# Patient Record
Sex: FEMALE
[De-identification: names, ages, dates, MRNs, and addresses within clinical notes are randomized per-mention and may not be internally consistent; named-entity substitution may affect disease eponyms.]

---

## 2018-08-24 ENCOUNTER — HOSPITAL (OUTPATIENT)
Dept: OTHER | Age: 28
End: 2018-08-24

## 2019-04-10 ENCOUNTER — OFFICE VISIT (OUTPATIENT)
Dept: INTERNAL MEDICINE | Age: 29
End: 2019-04-10

## 2019-04-10 ENCOUNTER — TELEPHONE (OUTPATIENT)
Dept: INTERNAL MEDICINE | Age: 29
End: 2019-04-10

## 2019-04-10 DIAGNOSIS — H61.21 HEARING LOSS OF RIGHT EAR DUE TO CERUMEN IMPACTION: ICD-10-CM

## 2019-04-10 DIAGNOSIS — J45.20 MILD INTERMITTENT ASTHMA WITHOUT COMPLICATION: Primary | ICD-10-CM

## 2019-04-10 DIAGNOSIS — F17.200 SMOKING ADDICTION: ICD-10-CM

## 2019-04-10 DIAGNOSIS — J30.2 SEASONAL ALLERGIC RHINITIS, UNSPECIFIED TRIGGER: ICD-10-CM

## 2019-04-10 DIAGNOSIS — F43.10 PTSD (POST-TRAUMATIC STRESS DISORDER): ICD-10-CM

## 2019-04-10 PROCEDURE — 99204 OFFICE O/P NEW MOD 45 MIN: CPT | Performed by: INTERNAL MEDICINE

## 2019-04-10 PROCEDURE — 69209 REMOVE IMPACTED EAR WAX UNI: CPT | Performed by: INTERNAL MEDICINE

## 2019-04-10 RX ORDER — ALBUTEROL SULFATE 90 UG/1
AEROSOL, METERED RESPIRATORY (INHALATION) EVERY 4 HOURS PRN
COMMUNITY
End: 2019-04-10 | Stop reason: SDUPTHER

## 2019-04-10 RX ORDER — FLUTICASONE PROPIONATE AND SALMETEROL 250; 50 UG/1; UG/1
1 POWDER RESPIRATORY (INHALATION) 2 TIMES DAILY
Qty: 1 EACH | Refills: 11 | Status: SHIPPED | OUTPATIENT
Start: 2019-04-10

## 2019-04-10 RX ORDER — PREDNISONE 20 MG/1
20 TABLET ORAL DAILY
Qty: 10 TABLET | Refills: 10 | Status: SHIPPED | OUTPATIENT
Start: 2019-04-10

## 2019-04-10 RX ORDER — ALBUTEROL SULFATE 90 UG/1
2 AEROSOL, METERED RESPIRATORY (INHALATION) EVERY 4 HOURS PRN
Qty: 1 INHALER | Refills: 12 | Status: SHIPPED | OUTPATIENT
Start: 2019-04-10 | End: 2019-04-11 | Stop reason: SDUPTHER

## 2019-04-10 RX ORDER — ALBUTEROL SULFATE 90 UG/1
2 AEROSOL, METERED RESPIRATORY (INHALATION) EVERY 4 HOURS PRN
COMMUNITY
End: 2019-04-10 | Stop reason: SDUPTHER

## 2019-04-10 SDOH — HEALTH STABILITY: MENTAL HEALTH
STRESS IS WHEN SOMEONE FEELS TENSE, NERVOUS, ANXIOUS, OR CAN'T SLEEP AT NIGHT BECAUSE THEIR MIND IS TROUBLED. HOW STRESSED ARE YOU?: ONLY A LITTLE

## 2019-04-10 SDOH — HEALTH STABILITY: PHYSICAL HEALTH: ON AVERAGE, HOW MANY DAYS PER WEEK DO YOU ENGAGE IN MODERATE TO STRENUOUS EXERCISE (LIKE A BRISK WALK)?: 7 DAYS

## 2019-04-10 SDOH — HEALTH STABILITY: PHYSICAL HEALTH: ON AVERAGE, HOW MANY MINUTES DO YOU ENGAGE IN EXERCISE AT THIS LEVEL?: 150+ MIN

## 2019-04-10 ASSESSMENT — ENCOUNTER SYMPTOMS
HEADACHES: 0
VOICE CHANGE: 0
BRUISES/BLEEDS EASILY: 0
CHEST TIGHTNESS: 1
TROUBLE SWALLOWING: 0
SORE THROAT: 0
STRIDOR: 1
CONSTITUTIONAL NEGATIVE: 1
RHINORRHEA: 0
WHEEZING: 1
ABDOMINAL PAIN: 0
ENDOCRINE NEGATIVE: 1
DIZZINESS: 0
LIGHT-HEADEDNESS: 0
COUGH: 1
SHORTNESS OF BREATH: 1

## 2019-04-10 ASSESSMENT — PAIN SCALES - GENERAL: PAINLEVEL: 1-2

## 2019-04-11 ENCOUNTER — APPOINTMENT (OUTPATIENT)
Dept: INTERNAL MEDICINE | Age: 29
End: 2019-04-11

## 2019-04-11 RX ORDER — FLUTICASONE PROPIONATE AND SALMETEROL 113; 14 UG/1; UG/1
1 POWDER, METERED RESPIRATORY (INHALATION) 2 TIMES DAILY
Qty: 1 EACH | Refills: 3 | Status: SHIPPED | OUTPATIENT
Start: 2019-04-11

## 2019-04-12 ENCOUNTER — APPOINTMENT (OUTPATIENT)
Dept: INTERNAL MEDICINE | Age: 29
End: 2019-04-12

## 2019-09-12 DIAGNOSIS — J45.20 MILD INTERMITTENT ASTHMA WITHOUT COMPLICATION: Primary | ICD-10-CM

## 2019-11-21 ENCOUNTER — EXTERNAL RECORD (OUTPATIENT)
Dept: HEALTH INFORMATION MANAGEMENT | Facility: OTHER | Age: 29
End: 2019-11-21

## 2019-11-27 ENCOUNTER — TELEPHONE (OUTPATIENT)
Dept: INTERNAL MEDICINE | Age: 29
End: 2019-11-27

## 2020-09-01 PROBLEM — Z97.5 IUD (INTRAUTERINE DEVICE) IN PLACE: Status: ACTIVE | Noted: 2020-09-01

## 2021-02-10 PROBLEM — F43.10 PTSD (POST-TRAUMATIC STRESS DISORDER): Status: ACTIVE | Noted: 2021-02-10

## 2021-05-25 VITALS
DIASTOLIC BLOOD PRESSURE: 88 MMHG | BODY MASS INDEX: 22.75 KG/M2 | TEMPERATURE: 98.2 F | SYSTOLIC BLOOD PRESSURE: 120 MMHG | WEIGHT: 144.95 LBS | HEART RATE: 98 BPM | OXYGEN SATURATION: 95 % | HEIGHT: 67 IN

## 2024-01-20 ENCOUNTER — APPOINTMENT (OUTPATIENT)
Dept: CT IMAGING | Facility: HOSPITAL | Age: 34
End: 2024-01-20
Attending: EMERGENCY MEDICINE
Payer: OTHER MISCELLANEOUS

## 2024-01-20 ENCOUNTER — OFFICE VISIT (OUTPATIENT)
Dept: FAMILY MEDICINE CLINIC | Facility: CLINIC | Age: 34
End: 2024-01-20
Payer: COMMERCIAL

## 2024-01-20 ENCOUNTER — HOSPITAL ENCOUNTER (EMERGENCY)
Facility: HOSPITAL | Age: 34
Discharge: HOME | End: 2024-01-20
Attending: EMERGENCY MEDICINE
Payer: OTHER MISCELLANEOUS

## 2024-01-20 VITALS
RESPIRATION RATE: 16 BRPM | TEMPERATURE: 98 F | OXYGEN SATURATION: 97 % | DIASTOLIC BLOOD PRESSURE: 77 MMHG | SYSTOLIC BLOOD PRESSURE: 111 MMHG | HEART RATE: 70 BPM

## 2024-01-20 VITALS
HEART RATE: 93 BPM | TEMPERATURE: 98 F | DIASTOLIC BLOOD PRESSURE: 72 MMHG | OXYGEN SATURATION: 98 % | SYSTOLIC BLOOD PRESSURE: 124 MMHG | WEIGHT: 149.81 LBS | BODY MASS INDEX: 23.51 KG/M2 | RESPIRATION RATE: 20 BRPM | HEIGHT: 67 IN

## 2024-01-20 DIAGNOSIS — M54.2 NECK PAIN, ACUTE: Primary | ICD-10-CM

## 2024-01-20 DIAGNOSIS — M54.9 UPPER BACK PAIN ON RIGHT SIDE: Primary | ICD-10-CM

## 2024-01-20 LAB — B-HCG UR QL: NEGATIVE

## 2024-01-20 PROCEDURE — 99215 OFFICE O/P EST HI 40 MIN: CPT | Performed by: NURSE PRACTITIONER

## 2024-01-20 PROCEDURE — 72128 CT CHEST SPINE W/O DYE: CPT | Performed by: EMERGENCY MEDICINE

## 2024-01-20 PROCEDURE — 96374 THER/PROPH/DIAG INJ IV PUSH: CPT

## 2024-01-20 PROCEDURE — 99284 EMERGENCY DEPT VISIT MOD MDM: CPT

## 2024-01-20 PROCEDURE — 81025 URINE PREGNANCY TEST: CPT

## 2024-01-20 PROCEDURE — 96375 TX/PRO/DX INJ NEW DRUG ADDON: CPT

## 2024-01-20 PROCEDURE — 72125 CT NECK SPINE W/O DYE: CPT | Performed by: EMERGENCY MEDICINE

## 2024-01-20 PROCEDURE — 3078F DIAST BP <80 MM HG: CPT | Performed by: NURSE PRACTITIONER

## 2024-01-20 PROCEDURE — 3008F BODY MASS INDEX DOCD: CPT | Performed by: NURSE PRACTITIONER

## 2024-01-20 PROCEDURE — 3074F SYST BP LT 130 MM HG: CPT | Performed by: NURSE PRACTITIONER

## 2024-01-20 RX ORDER — CYCLOBENZAPRINE HCL 10 MG
10 TABLET ORAL 3 TIMES DAILY PRN
Qty: 20 TABLET | Refills: 0 | Status: SHIPPED | OUTPATIENT
Start: 2024-01-20 | End: 2024-01-27

## 2024-01-20 RX ORDER — IBUPROFEN 600 MG/1
600 TABLET ORAL EVERY 8 HOURS PRN
Qty: 30 TABLET | Refills: 0 | Status: SHIPPED | OUTPATIENT
Start: 2024-01-20 | End: 2024-01-27

## 2024-01-20 RX ORDER — METHYLPREDNISOLONE 4 MG/1
TABLET ORAL
Qty: 21 TABLET | Refills: 0 | Status: SHIPPED | OUTPATIENT
Start: 2024-01-20

## 2024-01-20 RX ORDER — CYCLOBENZAPRINE HCL 10 MG
10 TABLET ORAL ONCE
Status: COMPLETED | OUTPATIENT
Start: 2024-01-20 | End: 2024-01-20

## 2024-01-20 RX ORDER — BUPROPION HYDROCHLORIDE 300 MG/1
TABLET ORAL
COMMUNITY
Start: 2023-12-16

## 2024-01-20 RX ORDER — KETOROLAC TROMETHAMINE 15 MG/ML
15 INJECTION, SOLUTION INTRAMUSCULAR; INTRAVENOUS ONCE
Status: COMPLETED | OUTPATIENT
Start: 2024-01-20 | End: 2024-01-20

## 2024-01-20 RX ORDER — MORPHINE SULFATE 4 MG/ML
4 INJECTION, SOLUTION INTRAMUSCULAR; INTRAVENOUS ONCE
Status: COMPLETED | OUTPATIENT
Start: 2024-01-20 | End: 2024-01-20

## 2024-01-20 NOTE — DISCHARGE INSTRUCTIONS
Please return to the emergency department if you develop severe pain that is not controlled by pain medications or if you are unable to walk because of pain or weakness.  Return to the emergency department immediately if you develop fevers, loss of bowel or bladder control (dribbling of urine or having accidents you would not normally have), inability to urinate, numbness of your genital or anal area, or weakness/numbness of your legs or arms as these could all be signs of a serious medical emergency.

## 2024-01-20 NOTE — ED PROVIDER NOTES
Patient Seen in: [facility] Emergency Department      History     Chief Complaint   Patient presents with    Neck Pain     Stated Complaint: Severe neck pain 10/10, crying from pain. Radiates to right arm and right chest*    Subjective:   HPI  33-year-old female is right-hand-dominant presents for evaluation of back pain.  He works as a  and does do repetitive movements with the right upper extremity.  The day before the pain started a large dog was fighting with her requiring her to maneuver in the chest or arm in different ways.  She also does heavy lifting at work.  She woke up with the pain 3 days ago.  Pain is located at the upper back and radiates towards the right shoulder blade and towards the right chest.  She feels like a cramping sensation if she does not maintain good posture.  Pain is worse with heavy lifting.  No loss of strength or sensation.  No difficulty breathing.  No ripping or tearing sensation in the chest or back.      Objective:   No pertinent past medical history.            No pertinent past surgical history.              No pertinent social history.            Review of Systems    Positive for stated complaint: Severe neck pain 10/10, crying from pain. Radiates to right arm and right chest*  Other systems are as noted in HPI.  Constitutional and vital signs reviewed.      All other systems reviewed and negative except as noted above.    Physical Exam     ED Triage Vitals [01/20/24 0752]   BP (!) 142/94   Pulse 79   Resp 16   Temp 97.8 °F (36.6 °C)   Temp src Temporal   SpO2 98 %   O2 Device None (Room air)       Current:/77   Pulse 70   Temp 97.8 °F (36.6 °C) (Temporal)   Resp 16   LMP 01/14/2024   SpO2 97%         Physical Exam  Vitals and nursing note reviewed.   Constitutional:       Appearance: She is well-developed.   HENT:      Head: Normocephalic and atraumatic.   Eyes:      Extraocular Movements: Extraocular movements intact.   Cardiovascular:       Rate and Rhythm: Normal rate and regular rhythm.      Heart sounds: Normal heart sounds.      Comments: Bilateral carotid and radial pulses are 2+  Pulmonary:      Effort: Pulmonary effort is normal.      Breath sounds: Normal breath sounds.   Abdominal:      Palpations: Abdomen is soft.   Musculoskeletal:         General: Normal range of motion.      Cervical back: Normal range of motion.        Back:       Comments: Tender to palpation in the demarcated area.  There is no underlying rash or lesion   Skin:     General: Skin is warm.   Neurological:      General: No focal deficit present.      Mental Status: She is alert.      Cranial Nerves: No cranial nerve deficit.      Sensory: No sensory deficit.      Motor: No weakness.      Coordination: Coordination normal.      Gait: Gait normal.      Comments: No focal deficits       Differential diagnosis includes but is not limited to cervical or thoracic radiculopathy, muscular strain or spasm, less likely compression fracture        ED Course     Labs Reviewed   POCT PREGNANCY URINE - Normal          ED Course as of 01/20/24 0928  ------------------------------------------------------------  Time: 01/20 0840  Comment: Upreg negative     CT SPINE THORACIC (CPT=72128)    Result Date: 1/20/2024  CONCLUSION:  1.  Subtle rightward curvature of the thoracic spine centered at T8-T9 which may be positional.  Minimal degenerative change within the thoracic spine as discussed above.  No acute osseous abnormality of the thoracic spine. 2.  Nonspecific nodular biapical pleural thickening which may represent sequela of previous pneumonitis or scar/atelectasis.      Dictated by (CST): Omer Robert MD on 1/20/2024 at 9:09 AM     Finalized by (CST): Omer Robert MD on 1/20/2024 at 9:11 AM          CT SPINE CERVICAL (CPT=72125)    Result Date: 1/20/2024  CONCLUSION:  1.  No acute osseous abnormalities cervical spine.  Straightening and slight reversal of normal cervical lordosis  centered at C5 which may be due to patient positioning or muscle spasm. 2.  Right paracentral disc bulge/osteophyte complex at C6-7 resulting in moderate narrowing the central canal and severe right greater than left neural foraminal narrowing.  Dictated by (CST): Omer Robert MD on 1/20/2024 at 9:06 AM     Finalized by (CST): Omer Robert MD on 1/20/2024 at 9:09 AM                  [facility]                                         Medical Decision Making  On arrival to the ED patient is ambulatory, without neurovascular deficits.  Per my independent interpretation of CT's cervical and thoracic spines there is C6-7 disc bulge as well as possible muscular spasm.  Discussed with patient that both of these may be contributing to her pain.  Discussed use of muscle relaxant, Medrol Dosepak, NSAIDs and outpatient follow-up and she is comfortable with the plan.  Return precautions provided for any worsening of her condition or neurovascular deficits and she is agreeable.    Problems Addressed:  Upper back pain on right side: acute illness or injury with systemic symptoms    Amount and/or Complexity of Data Reviewed  Radiology: ordered and independent interpretation performed. Decision-making details documented in ED Course.    Risk  Prescription drug management.  Parenteral controlled substances.        Disposition and Plan     Clinical Impression:  1. Upper back pain on right side         Disposition:  Discharge  1/20/2024  9:25 am    Follow-up:  Rudolph Sainz MD  [address] [place] [id number]  [phone]    Follow up            Medications Prescribed:  Current Discharge Medication List        START taking these medications    Details   ibuprofen 600 MG Oral Tab Take 1 tablet (600 mg total) by mouth every 8 (eight) hours as needed for Pain or Fever.  Qty: 30 tablet, Refills: 0      cyclobenzaprine 10 MG Oral Tab Take 1 tablet (10 mg total) by mouth 3 (three) times daily as needed for Muscle spasms.  Qty: 20  tablet, Refills: 0      methylPREDNISolone 4 MG Oral Tablet Therapy Pack Take as directed on dose pack instructions  Qty: 21 tablet, Refills: 0

## 2024-01-20 NOTE — ED INITIAL ASSESSMENT (HPI)
Severe neck pain 10/10, crying from pain. Woke up with this pain on Thursday, no injury or trauma to site. Possible overuse per pt.        Radiates to right arm and right chest. Some numbness to right upper arm. Slightly weaker right hand grasp. She is a  and had issues with grooming a dog 3 days ago.

## 2024-01-20 NOTE — PROGRESS NOTES
CHIEF COMPLAINT:     Chief Complaint   Patient presents with    Back Pain     Extreme pain, sudden to neck / upper back, nerve pain can't move - Entered by patient       HPI:   NAME@ is a 33 year old female who presents for complaint of neck pain for 3 days.    Location: right neck and right upper back  Severity: 10/10  Pain radiates to: right arm  Caused by: grooming a dog   Worsened by: any movement  Associated symptoms: numbness to right upper arm, severe pain   Home treatment tried: OTC  Prior neck pain hx: denies      Current Outpatient Medications   Medication Sig Dispense Refill    buPROPion  MG Oral Tablet 24 Hr       albuterol (PROAIR HFA) 108 (90 Base) MCG/ACT Inhalation Aero Soln Inhale 2 puffs into the lungs every 4 (four) hours as needed for Wheezing. 18 g 2    fluticasone propionate 50 MCG/ACT Nasal Suspension 2 sprays by Nasal route daily. 48 g 3    DULERA 200-5 MCG/ACT Inhalation Aerosol INHALE 2 PUFFS INTO THE LUNGS TWICE DAILY 13 g 0    DULERA 200-5 MCG/ACT Inhalation Aerosol INHALE 2 PUFFS INTO THE LUNGS TWICE DAILY 13 g 0    Levocetirizine Dihydrochloride 5 MG Oral Tab Take 1 tablet (5 mg total) by mouth every evening. 30 tablet 3    PARAGARD INTRAUTERINE COPPER IU by Intrauterine route.      Montelukast Sodium 10 MG Oral Tab Take 1 tablet (10 mg total) by mouth nightly. 90 tablet 3      Past Medical History:   Diagnosis Date    Asthma       Social History:  Social History     Socioeconomic History    Marital status: Single   Occupational History    Occupation: artist, performer, juggler   Tobacco Use    Smoking status: Former     Packs/day: 0.50     Years: 10.00     Additional pack years: 0.00     Total pack years: 5.00     Types: Cigarettes     Start date:      Quit date: 2013     Years since quittin.0    Smokeless tobacco: Never   Vaping Use    Vaping Use: Never used   Substance and Sexual Activity    Alcohol use: Yes     Alcohol/week: 1.0 standard drink of alcohol     Types: 1  Cans of beer per week    Drug use: Never    Sexual activity: Yes     Partners: Male     Birth control/protection: I.U.D.        REVIEW OF SYSTEMS:   Constitutional:  Denies fevers, chills, or weight loss  HEENT: See HPI; denies sore throat or nasal congestion  CV: denies chest pain or acute shortness of breath  PULM: no cough or SOB  GI: no N/V/C/D. No abdominal pain.   NEURO: see HPI.  No bladder or fecal incontinence   SKIN: no rash or bruising or skin discoloration  MSK: as above.     EXAM:   /72 (BP Location: Left arm, Patient Position: Sitting, Cuff Size: adult)   Pulse 93   Temp 97.9 °F (36.6 °C) (Tympanic)   Resp 20   Ht 5' 7\" (1.702 m)   Wt 149 lb 12.8 oz (67.9 kg)   LMP 01/14/2024   SpO2 98%   BMI 23.46 kg/m²    GENERAL: well developed, well nourished,in no apparent distress  HEENT: moist mucous membrane, PERRLA, EOMI.    M/S: Tenderness to right upper shoulder and cervical area   Trapezius tenderness on right side.  CV:  S1 S2 No murmur, RRR  Lungs:  Normal respiratory effort.  Lungs are clear.   NEURO:  Slightly weaker right hand grasp compared to left.   Normal wrist flexion and extension. Sensation grossly intact. Unable to move in different positions due to severe pain.         ASSESSMENT:   Lexi Da Silva is a 33 year old female who presents with complaints of neck pain.   Findings are consistent with   Encounter Diagnosis   Name Primary?    Neck pain, acute Yes       PLAN:     Patient crying and in severe pain, 10/10.    She was unable to sleep in bed and needed to sleep on the floor. She is only able to sit in 1 position.     Discussed symptoms with patient and limitations of WIC. She agreed to going to the ER for treatment.    Pt redirected to [facility] for further work up and treatment.  Report placed on intake board.

## (undated) NOTE — LETTER
Date & Time: 1/20/2024, 9:25 AM  Patient: Lexi Da Silva  Encounter Provider(s):    Eileen Eric MD       To Whom It May Concern:    Lexi Da Silva was seen and treated in our department on 1/20/2024. She can return to work on 1/25/24.    If you have any questions or concerns, please do not hesitate to call.        _____________________________  Physician/APC Signature